# Patient Record
Sex: MALE | Race: WHITE | NOT HISPANIC OR LATINO
[De-identification: names, ages, dates, MRNs, and addresses within clinical notes are randomized per-mention and may not be internally consistent; named-entity substitution may affect disease eponyms.]

---

## 2019-03-04 PROBLEM — Z00.00 ENCOUNTER FOR PREVENTIVE HEALTH EXAMINATION: Status: ACTIVE | Noted: 2019-03-04

## 2019-03-05 ENCOUNTER — TRANSCRIPTION ENCOUNTER (OUTPATIENT)
Age: 59
End: 2019-03-05

## 2019-03-05 ENCOUNTER — APPOINTMENT (OUTPATIENT)
Dept: PULMONOLOGY | Facility: CLINIC | Age: 59
End: 2019-03-05
Payer: COMMERCIAL

## 2019-03-05 VITALS
BODY MASS INDEX: 24.38 KG/M2 | SYSTOLIC BLOOD PRESSURE: 104 MMHG | OXYGEN SATURATION: 97 % | WEIGHT: 180 LBS | HEIGHT: 72 IN | TEMPERATURE: 98.5 F | HEART RATE: 67 BPM | DIASTOLIC BLOOD PRESSURE: 66 MMHG

## 2019-03-05 DIAGNOSIS — I25.10 ATHEROSCLEROTIC HEART DISEASE OF NATIVE CORONARY ARTERY W/OUT ANGINA PECTORIS: ICD-10-CM

## 2019-03-05 DIAGNOSIS — Z82.49 FAMILY HISTORY OF ISCHEMIC HEART DISEASE AND OTHER DISEASES OF THE CIRCULATORY SYSTEM: ICD-10-CM

## 2019-03-05 DIAGNOSIS — E78.5 HYPERLIPIDEMIA, UNSPECIFIED: ICD-10-CM

## 2019-03-05 DIAGNOSIS — Z82.3 FAMILY HISTORY OF STROKE: ICD-10-CM

## 2019-03-05 PROCEDURE — 99205 OFFICE O/P NEW HI 60 MIN: CPT

## 2019-03-05 RX ORDER — ROSUVASTATIN CALCIUM 40 MG/1
40 TABLET, FILM COATED ORAL
Refills: 0 | Status: ACTIVE | COMMUNITY

## 2019-03-05 NOTE — ASSESSMENT
[FreeTextEntry1] : 60 yo man with hx of CAD, HLD self referred for evaluation of asthma. Mr. Pate was diagnosed with "asthma" late in life, and has evidence of bronchiectasis on his imaging by report. I am not convinced this is true allergic asthma; needs to be ruled out for ABPA/vasculitis/CF carrier mutation. He also has increased NIRANJAN use without actual respiratory deterioration which may point to anxiety especially given his wife's recent clinical course, but this is a diagnosis of exclusion. He has never had a workup for bronchiectasis as per his records. Mr. Pate will obtain CT imaging disks and PFTs for my review from [facility]. In the meantime it is prudent to do a full bronchiectasis workup; he has pending lab work this week with his cardiologist and I provided him with the serologies I want drawn so he can have this done simultaneously. Another possibility to consider is that this is atypical reflux, and I advised him on a 2 week PPI trial (Omeprazole 20mg QHS sent to pharmacy). Mr. Pate will follow up with me in 2 weeks after all blood work is resulted and I reviewed his imaging and PFTs.

## 2019-03-05 NOTE — HISTORY OF PRESENT ILLNESS
[Stable] : are stable [Difficulty Breathing During Exertion] : denies dyspnea on exertion [Feelings Of Weakness On Exertion] : denies exercise intolerance [Cough] : stable coughing [Wheezing] : denies wheezing [Regional Soft Tissue Swelling Both Lower Extremities] : denies lower extremity edema [Chest Pain Or Discomfort] : denies chest pain [Fever] : denies fever [0  -  Nothing at all] : 0, nothing at all [Class I - No Symptoms and No Limitations] : I [Oxygen] : the patient uses no supplemental oxygen [FreeTextEntry1] : 60 yo man with hx of CAD, HLD self referred for evaluation of asthma. He was diagnosed with asthma at age 52 because of chest tightness and cough. PFTs were done at that time as well; results not available. He has been on Breo for the past 2 years, which has been escalated because of increased NIRANJAN use but he denies any wheezing and any asthma exacerbations requiring hospitalizations. He has no exercise limitation and is very active; plays soccer regularly. He frequently travels, and has been to [place] and [place], but not recently. Non smoker. Unknown mold exposure. He is endorsing a cough that is very bothersome to him, and may be worse at night. Denies reflux. Does not endorse significant relief from Breo, but may have some relief from NIRANJAN. His NIRANJAN use has been excessive over the past 3 weeks. He has a lot of anxiety about his lung issues because his wife was complaining of cough and was diagnosed with asthma and then ended up having a GI malignancy.  No history of allergies, lung disease when he was a child. He has been followed by [facility] and is now seeking a second opinion.\par \par He showed me some records on his phone; recent CT chest showed mild cylindrical bronchiectasis and areas of emphysema but disks are not available for review.

## 2019-03-05 NOTE — PHYSICAL EXAM
[General Appearance - Well Developed] : well developed [Normal Appearance] : normal appearance [Well Groomed] : well groomed [General Appearance - Well Nourished] : well nourished [No Deformities] : no deformities [General Appearance - In No Acute Distress] : no acute distress [Normal Conjunctiva] : the conjunctiva exhibited no abnormalities [Normal Oropharynx] : normal oropharynx [Neck Appearance] : the appearance of the neck was normal [Heart Rate And Rhythm] : heart rate and rhythm were normal [Heart Sounds] : normal S1 and S2 [] : no respiratory distress [Respiration, Rhythm And Depth] : normal respiratory rhythm and effort [Exaggerated Use Of Accessory Muscles For Inspiration] : no accessory muscle use [Auscultation Breath Sounds / Voice Sounds] : lungs were clear to auscultation bilaterally [Bowel Sounds] : normal bowel sounds [Abdomen Soft] : soft [Abnormal Walk] : normal gait [Nail Clubbing] : no clubbing of the fingernails [Cyanosis, Localized] : no localized cyanosis [Skin Color & Pigmentation] : normal skin color and pigmentation [No Focal Deficits] : no focal deficits [Oriented To Time, Place, And Person] : oriented to person, place, and time [Impaired Insight] : insight and judgment were intact [Affect] : the affect was normal [Mood] : the mood was normal

## 2019-03-08 ENCOUNTER — LABORATORY RESULT (OUTPATIENT)
Age: 59
End: 2019-03-08

## 2019-03-09 LAB
A1AT SERPL-MCNC: 135 MG/DL
ALBUMIN SERPL ELPH-MCNC: 4.7 G/DL
ALP BLD-CCNC: 55 U/L
ALT SERPL-CCNC: 31 U/L
ANION GAP SERPL CALC-SCNC: 11 MMOL/L
AST SERPL-CCNC: 31 U/L
BASOPHILS # BLD AUTO: 0.03 K/UL
BASOPHILS NFR BLD AUTO: 0.6 %
BILIRUB SERPL-MCNC: 0.5 MG/DL
BUN SERPL-MCNC: 14 MG/DL
CALCIUM SERPL-MCNC: 9.9 MG/DL
CHLORIDE SERPL-SCNC: 103 MMOL/L
CO2 SERPL-SCNC: 28 MMOL/L
CREAT SERPL-MCNC: 1.09 MG/DL
EOSINOPHIL # BLD AUTO: 0.08 K/UL
EOSINOPHIL NFR BLD AUTO: 1.6 %
GLUCOSE SERPL-MCNC: 93 MG/DL
HCT VFR BLD CALC: 44.9 %
HGB BLD-MCNC: 14.8 G/DL
HIV1+2 AB SPEC QL IA.RAPID: NONREACTIVE
IMM GRANULOCYTES NFR BLD AUTO: 0.2 %
LYMPHOCYTES # BLD AUTO: 1.82 K/UL
LYMPHOCYTES NFR BLD AUTO: 36.1 %
MAN DIFF?: NORMAL
MCHC RBC-ENTMCNC: 31.2 PG
MCHC RBC-ENTMCNC: 33 GM/DL
MCV RBC AUTO: 94.5 FL
MONOCYTES # BLD AUTO: 0.49 K/UL
MONOCYTES NFR BLD AUTO: 9.7 %
NEUTROPHILS # BLD AUTO: 2.61 K/UL
NEUTROPHILS NFR BLD AUTO: 51.8 %
PLATELET # BLD AUTO: 206 K/UL
POTASSIUM SERPL-SCNC: 5.1 MMOL/L
PROT SERPL-MCNC: 7.1 G/DL
RBC # BLD: 4.75 M/UL
RBC # FLD: 12.2 %
RHEUMATOID FACT SER QL: <10 IU/ML
SODIUM SERPL-SCNC: 142 MMOL/L
WBC # FLD AUTO: 5.04 K/UL

## 2019-03-11 ENCOUNTER — RESULT REVIEW (OUTPATIENT)
Age: 59
End: 2019-03-11

## 2019-03-11 LAB
ANA SER IF-ACNC: NEGATIVE
CCP AB SER IA-ACNC: <8 UNITS
DEPRECATED KAPPA LC FREE/LAMBDA SER: 0.94 RATIO
IGA SER QL IEP: 134 MG/DL
IGG SER QL IEP: 949 MG/DL
IGM SER QL IEP: 106 MG/DL
KAPPA LC CSF-MCNC: 1.12 MG/DL
KAPPA LC SERPL-MCNC: 1.05 MG/DL
MPO AB + PR3 PNL SER: NORMAL
RF+CCP IGG SER-IMP: NEGATIVE

## 2019-03-12 ENCOUNTER — RESULT REVIEW (OUTPATIENT)
Age: 59
End: 2019-03-12

## 2019-03-12 ENCOUNTER — TRANSCRIPTION ENCOUNTER (OUTPATIENT)
Age: 59
End: 2019-03-12

## 2019-03-12 LAB — GALACTOMANNAN AG SERPL QL IA: <0.5 INDEX

## 2019-03-13 ENCOUNTER — RESULT REVIEW (OUTPATIENT)
Age: 59
End: 2019-03-13

## 2019-03-13 LAB
A FLAVUS AB FLD QL: NEGATIVE
A FUMIGATUS AB FLD QL: NEGATIVE
A NIGER AB FLD QL: NEGATIVE

## 2019-03-14 ENCOUNTER — RESULT REVIEW (OUTPATIENT)
Age: 59
End: 2019-03-14

## 2019-03-14 LAB
ASPERGILLUS FLAVUS PRECIPITINS: NEGATIVE
ASPERGILLUS FUMIGATES PRECIPTINS: NEGATIVE
ASPERGILLUS NIGER PRECIPITINS: NEGATIVE

## 2019-03-18 ENCOUNTER — RESULT REVIEW (OUTPATIENT)
Age: 59
End: 2019-03-18

## 2019-03-18 ENCOUNTER — TRANSCRIPTION ENCOUNTER (OUTPATIENT)
Age: 59
End: 2019-03-18

## 2019-03-18 LAB
CFTR MUT TESTED BLD/T: POSITIVE
TOTAL IGE SMQN RAST: 50 KU/L

## 2019-03-21 ENCOUNTER — TRANSCRIPTION ENCOUNTER (OUTPATIENT)
Age: 59
End: 2019-03-21

## 2019-03-25 ENCOUNTER — TRANSCRIPTION ENCOUNTER (OUTPATIENT)
Age: 59
End: 2019-03-25

## 2019-03-26 ENCOUNTER — APPOINTMENT (OUTPATIENT)
Dept: INTERNAL MEDICINE | Facility: CLINIC | Age: 59
End: 2019-03-26

## 2019-03-26 ENCOUNTER — APPOINTMENT (OUTPATIENT)
Dept: PULMONOLOGY | Facility: CLINIC | Age: 59
End: 2019-03-26
Payer: COMMERCIAL

## 2019-03-26 VITALS
HEIGHT: 72 IN | TEMPERATURE: 97.8 F | OXYGEN SATURATION: 96 % | BODY MASS INDEX: 24.38 KG/M2 | HEART RATE: 51 BPM | DIASTOLIC BLOOD PRESSURE: 83 MMHG | SYSTOLIC BLOOD PRESSURE: 143 MMHG | WEIGHT: 180 LBS

## 2019-03-26 PROCEDURE — 99214 OFFICE O/P EST MOD 30 MIN: CPT

## 2019-03-26 RX ORDER — OMEPRAZOLE 20 MG/1
20 TABLET, DELAYED RELEASE ORAL
Qty: 14 | Refills: 0 | Status: DISCONTINUED | COMMUNITY
Start: 2019-03-05 | End: 2019-03-26

## 2019-03-26 NOTE — PHYSICAL EXAM
[General Appearance - Well Developed] : well developed [Normal Appearance] : normal appearance [Well Groomed] : well groomed [General Appearance - Well Nourished] : well nourished [No Deformities] : no deformities [General Appearance - In No Acute Distress] : no acute distress [Normal Conjunctiva] : the conjunctiva exhibited no abnormalities [Normal Oropharynx] : normal oropharynx [Neck Appearance] : the appearance of the neck was normal [Heart Rate And Rhythm] : heart rate and rhythm were normal [Heart Sounds] : normal S1 and S2 [Respiration, Rhythm And Depth] : normal respiratory rhythm and effort [Exaggerated Use Of Accessory Muscles For Inspiration] : no accessory muscle use [Auscultation Breath Sounds / Voice Sounds] : lungs were clear to auscultation bilaterally [Bowel Sounds] : normal bowel sounds [Abdomen Soft] : soft [Abnormal Walk] : normal gait [Nail Clubbing] : no clubbing of the fingernails [Cyanosis, Localized] : no localized cyanosis [Skin Color & Pigmentation] : normal skin color and pigmentation [] : no rash [No Focal Deficits] : no focal deficits [Oriented To Time, Place, And Person] : oriented to person, place, and time [Impaired Insight] : insight and judgment were intact [Affect] : the affect was normal [Mood] : the mood was normal

## 2019-03-26 NOTE — HISTORY OF PRESENT ILLNESS
[Stable] : are stable [Difficulty Breathing During Exertion] : denies dyspnea on exertion [Feelings Of Weakness On Exertion] : denies exercise intolerance [Cough] : stable coughing [Wheezing] : denies wheezing [Regional Soft Tissue Swelling Both Lower Extremities] : denies lower extremity edema [Chest Pain Or Discomfort] : denies chest pain [Fever] : denies fever [Oxygen] : the patient uses no supplemental oxygen [0  -  Nothing at all] : 0, nothing at all [Class I - No Symptoms and No Limitations] : I [FreeTextEntry1] : 60 yo man with hx of CAD, HLD and "asthma" here for follow up. Initially saw me on 3/26/2019 for a second opinion for persistent cough and asthma diagnosis. Imaging showed bronchiectasis. Has been taking Breo for 2 years without real improvement. Has no exercise limitation. Has been using PPI and thought he felt a difference initially but not any more. Has nasal stuffiness and throat clearing but is unsure about post nasal drip.

## 2019-03-26 NOTE — ASSESSMENT
[FreeTextEntry1] : 60 yo with a diagnosis of "asthma" here for follow up. He has mild bronchiectasis on imaging and has a clinical history that is not consistent with asthma. He has no exercise limitation; has never had an exacerbation; and has no benefit from any ICS/LABA combination. I initiated a workup for bronchiectasis and he is now known to be a Cystic Fibrosis carrier ([id number]). He has children who are all healthy; he will inform his children about the diagnosis since they may be carriers and this may impact family planning. Long term clinical outcomes on CF carriers are not known but this population is being diagnosed with increasing frequency and many have some mild form of organ dysfunction that may or may not be influencing function.  I think his bronchiectasis is stemming from his CF carrier status. I am also not sure about the extent of limitation of lung function because of the bronchiectasis, and he is still working on getting his prior PFT records. We will perform PFTs and a 6 MWT as well since his last assessment was in 2018. His cough and upper airway symptoms may be secondary to rhinitis so I prescribed a nasal ICS and H2 blocker. I informed him that it will take at least 4 weeks of regular use to see any benefit. He is to continue with a PPI. He will attempt a trial off Breo and will monitor the use of NIRANJAN. He will follow up with me in 4-6 weeks.

## 2019-04-09 ENCOUNTER — TRANSCRIPTION ENCOUNTER (OUTPATIENT)
Age: 59
End: 2019-04-09

## 2019-04-16 ENCOUNTER — APPOINTMENT (OUTPATIENT)
Dept: PULMONOLOGY | Facility: CLINIC | Age: 59
End: 2019-04-16
Payer: COMMERCIAL

## 2019-04-16 PROCEDURE — 94726 PLETHYSMOGRAPHY LUNG VOLUMES: CPT

## 2019-04-16 PROCEDURE — 94618 PULMONARY STRESS TESTING: CPT

## 2019-04-16 PROCEDURE — 94060 EVALUATION OF WHEEZING: CPT

## 2019-04-16 PROCEDURE — 94729 DIFFUSING CAPACITY: CPT

## 2019-04-19 ENCOUNTER — RX RENEWAL (OUTPATIENT)
Age: 59
End: 2019-04-19

## 2019-04-19 RX ORDER — OMEPRAZOLE 20 MG/1
20 CAPSULE, DELAYED RELEASE ORAL
Qty: 30 | Refills: 3 | Status: ACTIVE | COMMUNITY
Start: 2019-03-18

## 2019-04-22 ENCOUNTER — MOBILE ON CALL (OUTPATIENT)
Age: 59
End: 2019-04-22

## 2019-04-23 ENCOUNTER — APPOINTMENT (OUTPATIENT)
Dept: PULMONOLOGY | Facility: CLINIC | Age: 59
End: 2019-04-23
Payer: COMMERCIAL

## 2019-04-23 VITALS
BODY MASS INDEX: 24.11 KG/M2 | OXYGEN SATURATION: 96 % | DIASTOLIC BLOOD PRESSURE: 63 MMHG | SYSTOLIC BLOOD PRESSURE: 101 MMHG | HEIGHT: 72 IN | WEIGHT: 178 LBS | HEART RATE: 67 BPM | TEMPERATURE: 98.1 F

## 2019-04-23 PROCEDURE — 99214 OFFICE O/P EST MOD 30 MIN: CPT

## 2019-04-23 RX ORDER — FLUTICASONE FUROATE AND VILANTEROL TRIFENATATE 100; 25 UG/1; UG/1
100-25 POWDER RESPIRATORY (INHALATION)
Refills: 0 | Status: DISCONTINUED | COMMUNITY
End: 2019-04-23

## 2019-04-23 NOTE — PHYSICAL EXAM
[General Appearance - Well Developed] : well developed [Normal Conjunctiva] : the conjunctiva exhibited no abnormalities [General Appearance - Well Nourished] : well nourished [Neck Appearance] : the appearance of the neck was normal [Normal Oropharynx] : normal oropharynx [] : no respiratory distress [Heart Rate And Rhythm] : heart rate and rhythm were normal [Respiration, Rhythm And Depth] : normal respiratory rhythm and effort [Exaggerated Use Of Accessory Muscles For Inspiration] : no accessory muscle use [Auscultation Breath Sounds / Voice Sounds] : lungs were clear to auscultation bilaterally [Abdomen Soft] : soft [Abnormal Walk] : normal gait [Bowel Sounds] : normal bowel sounds [Nail Clubbing] : no clubbing of the fingernails [Cyanosis, Localized] : no localized cyanosis [Skin Color & Pigmentation] : normal skin color and pigmentation [No Focal Deficits] : no focal deficits [Oriented To Time, Place, And Person] : oriented to person, place, and time [Affect] : the affect was normal [Impaired Insight] : insight and judgment were intact [Mood] : the mood was normal

## 2019-04-23 NOTE — HISTORY OF PRESENT ILLNESS
[Difficulty Breathing During Exertion] : denies dyspnea on exertion [Feelings Of Weakness On Exertion] : denies exercise intolerance [Improved] : have improved [Wheezing] : denies wheezing [Cough] : denies coughing [Fever] : denies fever [Chest Pain Or Discomfort] : denies chest pain [Regional Soft Tissue Swelling Both Lower Extremities] : denies lower extremity edema [0  -  Nothing at all] : 0, nothing at all [Class I - No Symptoms and No Limitations] : I [Oxygen] : the patient uses no supplemental oxygen [FreeTextEntry1] : 60 yo man with hx of CAD, HLD here for follow up. He previously had a diagnosis of asthma. On further work up we now know he has bronchiectasis with mild obstruction in  the setting of being a CF carrier. Also with rhinitis; and has been on nasal ICS/antihistamine. Has been doing well. Stopped taking Breo; no need for NIRANJAN. Rhinitis management has been helping significantly. No exercise limitation. Is still taking a PPI.

## 2019-04-23 NOTE — ASSESSMENT
[FreeTextEntry1] : 58 yo man with HTN, CAD, bronchiectasis, mild airway obstruction, and CF carrier. PFTs performed previously showed mild airway obstruction with normal DLCO. FEV1 stable as compared to prior spirometry from [facility]. Doing well off all bronchodilators, and on rhinitis treatment with nasal ICS and antihistamine. Can continue NIRANJAN use if needed. I advised him that he can stop using PPI for now (initially started because of cough) and see if his symptoms worsen. If they do then he will need to see a gastroenterologist for further GERD workup/evaluation. If his rhinitis symptoms are not 100% controlled in another 2 to 4 weeks will add Singular. Mr. Pate will keep me informed on his progress by telephone. Return for follow up in 3-6 months or sooner if needed.

## 2019-09-14 ENCOUNTER — EMERGENCY (EMERGENCY)
Facility: HOSPITAL | Age: 59
LOS: 1 days | Discharge: HOME | End: 2019-09-14
Attending: EMERGENCY MEDICINE | Admitting: EMERGENCY MEDICINE
Payer: COMMERCIAL

## 2019-09-14 VITALS
DIASTOLIC BLOOD PRESSURE: 79 MMHG | SYSTOLIC BLOOD PRESSURE: 136 MMHG | RESPIRATION RATE: 18 BRPM | HEART RATE: 84 BPM | TEMPERATURE: 98 F | OXYGEN SATURATION: 99 %

## 2019-09-14 DIAGNOSIS — M54.5 LOW BACK PAIN: ICD-10-CM

## 2019-09-14 DIAGNOSIS — M62.830 MUSCLE SPASM OF BACK: ICD-10-CM

## 2019-09-14 PROCEDURE — 99283 EMERGENCY DEPT VISIT LOW MDM: CPT

## 2019-09-14 RX ORDER — DIAZEPAM 5 MG
1 TABLET ORAL
Qty: 10 | Refills: 0
Start: 2019-09-14

## 2019-09-14 RX ORDER — DIAZEPAM 5 MG
5 TABLET ORAL ONCE
Refills: 0 | Status: DISCONTINUED | OUTPATIENT
Start: 2019-09-14 | End: 2019-09-14

## 2019-09-14 RX ADMIN — Medication 5 MILLIGRAM(S): at 16:04

## 2019-09-14 NOTE — ED PROVIDER NOTE - PATIENT PORTAL LINK FT
You can access the FollowMyHealth Patient Portal offered by [facility] by registering at the following website: [email or web address]. By joining [employer]’s FollowMyHealth portal, you will also be able to view your health information using other applications (apps) compatible with our system.

## 2019-09-14 NOTE — ED ADULT TRIAGE NOTE - CHIEF COMPLAINT QUOTE
Patient to ED with complaint of back spasm beginning today.  Ambulating with steady gait and normal ROM

## 2019-09-14 NOTE — ED ADULT NURSE NOTE - NSIMPLEMENTINTERV_GEN_ALL_ED
Implemented All Universal Safety Interventions:  [place] to call system. Call bell, personal items and telephone within reach. Instruct patient to call for assistance. Room bathroom lighting operational. Non-slip footwear when patient is off stretcher. Physically safe environment: no spills, clutter or unnecessary equipment. Stretcher in lowest position, wheels locked, appropriate side rails in place.

## 2019-09-14 NOTE — ED PROVIDER NOTE - PHYSICAL EXAMINATION
CONSTITUTIONAL: Well appearing, well nourished, awake, alert, oriented to person, place, time/situation and in no apparent distress.  ENT: Airway patent, Nasal mucosa clear. Mouth with normal mucosa.  EYES: Clear bilaterally.  RESPIRATORY: Breathing comfortably with normal RR.  MSK: Range of motion is not limited, no deformities noted.  NEURO: Alert and oriented, no focal deficits.  SKIN: Skin normal color for race, warm, dry and intact. No evidence of rash.  PSYCH: Alert and oriented to person, place, time/situation. normal mood and affect. no apparent risk to self or others. CONSTITUTIONAL: Well appearing, well nourished, awake, alert, oriented to person, place, time/situation and in no apparent distress.  ENT: Airway patent, Nasal mucosa clear. Mouth with normal mucosa.  EYES: Clear bilaterally.  RESPIRATORY: Breathing comfortably with normal RR.  MSK: Range of motion is not limited, no deformities noted.  No midline TTP over spine.   NEURO: Alert and oriented, no focal deficits.  SKIN: Skin normal color for race, warm, dry and intact. No evidence of rash.  PSYCH: Alert and oriented to person, place, time/situation. normal mood and affect. no apparent risk to self or others.

## 2019-09-14 NOTE — ED PROVIDER NOTE - NSFOLLOWUPINSTRUCTIONS_ED_ALL_ED_FT
STAY WELL HYDRATED.     START STRETCHING TOMORROW FOR AT LEAST 15 MINUTES AT A TIME 2-3 TIMES A DAY.     PLEASE RETURN TO THE ER IMMEDIATELY OR CALL 911 FOR ANY HIGH FEVER, TROUBLE BREATHING, VOMITING, SEVERE PAIN, OR ANY OTHER CONCERNS.

## 2019-09-14 NOTE — ED PROVIDER NOTE - CLINICAL SUMMARY MEDICAL DECISION MAKING FREE TEXT BOX
Muscle spasm to lower back.  Will start on Valium for muscle relaxations and discussed stretching techniques and other supportive measures.

## 2019-09-14 NOTE — ED PROVIDER NOTE - OBJECTIVE STATEMENT
58 y/o male with PMHx of HLD presents to ED c/o lower back pain. Patient reports he went to the gym on Tuesday and since then has been having lower back pain presenting as tightness that turned into spasms. Denies any numbness, tingling, weakness, urinary or stool incontinence, or saddle paresthesia. States he took Motrin this morning without any symptomatic relief. Patient has appointment with     Sports Medicine MD Dr. Edward Mendelsohn. 60 y/o male with PMHx of HLD presents to ED c/o lower back pain. Patient reports he went to the gym on Tuesday lifting more weight than usual and since then has been having lower back pain presenting as tightness that turned into spasms. Denies any numbness, tingling, weakness, urinary or stool incontinence, or saddle paresthesia. States he took Motrin this morning without any symptomatic relief. Patient has appointment with Sports Medicine MD Dr. Edward Mendelsohn. 60 y/o male with PMHx of HLD presents to ED c/o lower back pain. Patient reports he went to the gym on Tuesday lifting more weight than usual and since then has been having lower back pain presenting as tightness that turned into spasms. Denies any numbness, tingling, weakness, urinary or stool incontinence, or saddle paresthesia. States he took Motrin this morning without any symptomatic relief. Patient has appointment with Sports Medicine MD Dr. Edward Mendelsohn.  Denies any changes in color of urine.

## 2019-10-07 ENCOUNTER — MEDICATION RENEWAL (OUTPATIENT)
Age: 59
End: 2019-10-07

## 2019-11-22 ENCOUNTER — RX RENEWAL (OUTPATIENT)
Age: 59
End: 2019-11-22

## 2019-11-22 ENCOUNTER — MOBILE ON CALL (OUTPATIENT)
Age: 59
End: 2019-11-22

## 2019-11-29 ENCOUNTER — MOBILE ON CALL (OUTPATIENT)
Age: 59
End: 2019-11-29

## 2019-11-29 ENCOUNTER — TRANSCRIPTION ENCOUNTER (OUTPATIENT)
Age: 59
End: 2019-11-29

## 2019-11-29 PROBLEM — E78.5 HYPERLIPIDEMIA, UNSPECIFIED: Chronic | Status: ACTIVE | Noted: 2019-09-14

## 2019-12-11 ENCOUNTER — APPOINTMENT (OUTPATIENT)
Dept: PULMONOLOGY | Facility: CLINIC | Age: 59
End: 2019-12-11

## 2019-12-12 ENCOUNTER — APPOINTMENT (OUTPATIENT)
Dept: PULMONOLOGY | Facility: CLINIC | Age: 59
End: 2019-12-12
Payer: COMMERCIAL

## 2019-12-12 PROCEDURE — 94726 PLETHYSMOGRAPHY LUNG VOLUMES: CPT

## 2019-12-12 PROCEDURE — 94729 DIFFUSING CAPACITY: CPT

## 2019-12-12 PROCEDURE — 94060 EVALUATION OF WHEEZING: CPT

## 2019-12-12 PROCEDURE — ZZZZZ: CPT

## 2019-12-13 ENCOUNTER — TRANSCRIPTION ENCOUNTER (OUTPATIENT)
Age: 59
End: 2019-12-13

## 2019-12-20 ENCOUNTER — APPOINTMENT (OUTPATIENT)
Dept: PULMONOLOGY | Facility: CLINIC | Age: 59
End: 2019-12-20
Payer: COMMERCIAL

## 2019-12-20 VITALS
OXYGEN SATURATION: 95 % | HEART RATE: 83 BPM | HEIGHT: 72 IN | WEIGHT: 178 LBS | TEMPERATURE: 97.8 F | BODY MASS INDEX: 24.11 KG/M2 | SYSTOLIC BLOOD PRESSURE: 127 MMHG | DIASTOLIC BLOOD PRESSURE: 82 MMHG

## 2019-12-20 PROCEDURE — 99214 OFFICE O/P EST MOD 30 MIN: CPT

## 2019-12-20 NOTE — PHYSICAL EXAM
[General Appearance - Well Developed] : well developed [General Appearance - Well Nourished] : well nourished [Normal Oropharynx] : normal oropharynx [Normal Conjunctiva] : the conjunctiva exhibited no abnormalities [Neck Appearance] : the appearance of the neck was normal [] : no respiratory distress [Heart Rate And Rhythm] : heart rate and rhythm were normal [Respiration, Rhythm And Depth] : normal respiratory rhythm and effort [Exaggerated Use Of Accessory Muscles For Inspiration] : no accessory muscle use [Auscultation Breath Sounds / Voice Sounds] : lungs were clear to auscultation bilaterally [Bowel Sounds] : normal bowel sounds [Abdomen Soft] : soft [Abnormal Walk] : normal gait [Cyanosis, Localized] : no localized cyanosis [Skin Color & Pigmentation] : normal skin color and pigmentation [Nail Clubbing] : no clubbing of the fingernails [No Focal Deficits] : no focal deficits [Mood] : the mood was normal [Oriented To Time, Place, And Person] : oriented to person, place, and time [Affect] : the affect was normal [Impaired Insight] : insight and judgment were intact

## 2019-12-20 NOTE — PROCEDURE
[FreeTextEntry1] : PFTs from 12/12/2019\par FEV1 70 (74 in 4/20/19)\par FEV1/FVC 59 (62 in 4/20/19)\par TLC 87\par DLCO 89\par no BD response\par \par No significant change in PFTS as compared to 4/2019.\par Mild obstruction. \par

## 2019-12-20 NOTE — ASSESSMENT
[FreeTextEntry1] : 60 yo man with hx of CAD, HLD here for follow up. Is a CF carrier and has bronchiectasis. Has rhinitis. Mild obstruction on PFTs with minimal change on testing on 12/12/2019 as compared to 4/20/2019. Can continue with NIRANJAN use only on an as needed basis; no escalation in therapy needed. I advised him to start nasal irrigation for at least 4 weeks; after that we will start to decrease his nasal spray use. Advised him to not stop any/both of the strays abruptly as this may cause rebound symptoms. Mr. Pate will follow up with me in 6 to 8 weeks of nasal irrigation use.

## 2019-12-20 NOTE — HISTORY OF PRESENT ILLNESS
[Difficulty Breathing During Exertion] : denies dyspnea on exertion [Improved] : have improved [Feelings Of Weakness On Exertion] : denies exercise intolerance [Cough] : denies coughing [Wheezing] : denies wheezing [Chest Pain Or Discomfort] : denies chest pain [Fever] : denies fever [Regional Soft Tissue Swelling Both Lower Extremities] : denies lower extremity edema [Oxygen] : the patient uses no supplemental oxygen [Class I - No Symptoms and No Limitations] : I [0  -  Nothing at all] : 0, nothing at all [FreeTextEntry1] : 60 yo man with hx of CAD, HLD here for follow up. He previously had a diagnosis of asthma. On further work up we now know he has bronchiectasis with mild obstruction in  the setting of being a CF carrier. Also with rhinitis; and has been on nasal ICS/antihistamine. Has been doing well without any limitation. Able to exercise without any issues. Stopped PPI use.

## 2020-01-08 ENCOUNTER — APPOINTMENT (OUTPATIENT)
Dept: PULMONOLOGY | Facility: CLINIC | Age: 60
End: 2020-01-08

## 2020-01-31 ENCOUNTER — TRANSCRIPTION ENCOUNTER (OUTPATIENT)
Age: 60
End: 2020-01-31

## 2020-03-31 ENCOUNTER — TRANSCRIPTION ENCOUNTER (OUTPATIENT)
Age: 60
End: 2020-03-31

## 2021-01-05 ENCOUNTER — TRANSCRIPTION ENCOUNTER (OUTPATIENT)
Age: 61
End: 2021-01-05

## 2021-02-23 ENCOUNTER — APPOINTMENT (OUTPATIENT)
Dept: PULMONOLOGY | Facility: CLINIC | Age: 61
End: 2021-02-23
Payer: COMMERCIAL

## 2021-02-23 VITALS
HEIGHT: 72 IN | SYSTOLIC BLOOD PRESSURE: 125 MMHG | DIASTOLIC BLOOD PRESSURE: 78 MMHG | WEIGHT: 176 LBS | BODY MASS INDEX: 23.84 KG/M2 | TEMPERATURE: 97.8 F | HEART RATE: 58 BPM | OXYGEN SATURATION: 96 %

## 2021-02-23 PROCEDURE — 99072 ADDL SUPL MATRL&STAF TM PHE: CPT

## 2021-02-23 PROCEDURE — 99213 OFFICE O/P EST LOW 20 MIN: CPT

## 2021-02-23 RX ORDER — AZELASTINE HYDROCHLORIDE 137 UG/1
0.1 SPRAY, METERED NASAL DAILY
Qty: 1 | Refills: 3 | Status: DISCONTINUED | COMMUNITY
Start: 2019-03-26 | End: 2021-02-23

## 2021-02-24 RX ORDER — ASPIRIN ENTERIC COATED TABLETS 81 MG 81 MG/1
81 TABLET, DELAYED RELEASE ORAL
Qty: 30 | Refills: 0 | Status: ACTIVE | COMMUNITY
Start: 2021-01-15

## 2021-02-24 NOTE — PHYSICAL EXAM
[No Acute Distress] : no acute distress [Normal Oropharynx] : normal oropharynx [Normal Appearance] : normal appearance [Normal Rate/Rhythm] : normal rate/rhythm [No Resp Distress] : no resp distress [Clear to Auscultation Bilaterally] : clear to auscultation bilaterally [No Abnormalities] : no abnormalities [Benign] : benign [Normal Gait] : normal gait [No Clubbing] : no clubbing [Normal Color/ Pigmentation] : normal color/ pigmentation [No Focal Deficits] : no focal deficits [Oriented x3] : oriented x3 [Normal Affect] : normal affect

## 2021-02-24 NOTE — HISTORY OF PRESENT ILLNESS
[Never] : never [TextBox_4] : 60 yo man with hx of CAD, HLD here for follow up. He previously had a diagnosis of asthma. On further work up we now know he has bronchiectasis with mild obstruction in the setting of being a CF carrier. Also with rhinitis; and has been on nasal ICS/antihistamine. Has been doing well without any limitation. Able to exercise without any issues. Stopped PPI use. \par \par \par 2/23/2021\par Has been doing well. Using Flonase and Azelastine, nasal irrigation. No exercise limitations. 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 Used NIRANJAN a few times, and felt relief. No wheezing.

## 2021-02-24 NOTE — REASON FOR VISIT
[Follow-Up] : a follow-up visit [Bronchiectasis] : bronchiectasis [Shortness of Breath] : shortness of breath

## 2022-03-07 ENCOUNTER — TRANSCRIPTION ENCOUNTER (OUTPATIENT)
Age: 62
End: 2022-03-07

## 2022-03-07 RX ORDER — ALBUTEROL SULFATE 90 UG/1
108 (90 BASE) INHALANT RESPIRATORY (INHALATION)
Qty: 1 | Refills: 5 | Status: ACTIVE | COMMUNITY
Start: 1900-01-01 | End: 1900-01-01

## 2022-05-03 ENCOUNTER — APPOINTMENT (OUTPATIENT)
Dept: PULMONOLOGY | Facility: CLINIC | Age: 62
End: 2022-05-03
Payer: COMMERCIAL

## 2022-05-03 VITALS
TEMPERATURE: 97.7 F | WEIGHT: 186 LBS | OXYGEN SATURATION: 97 % | HEIGHT: 72 IN | SYSTOLIC BLOOD PRESSURE: 106 MMHG | DIASTOLIC BLOOD PRESSURE: 66 MMHG | BODY MASS INDEX: 25.19 KG/M2 | HEART RATE: 50 BPM

## 2022-05-03 PROCEDURE — 99214 OFFICE O/P EST MOD 30 MIN: CPT

## 2022-05-05 NOTE — ASSESSMENT
[FreeTextEntry1] : REVIEWED:\par PFTs from 12/12/2019\par FEV1 70 (74 in 4/20/19)\par FEV1/FVC 59 (62 in 4/20/19)\par TLC 87\par DLCO 89\par no BD response\par No significant change in PFTS as compared to 4/2019.\par Mild obstruction. \par \par CT chest 2018: bronchiectasis\par \par 60yo man with CAD, HLD, CF carrier with bronchiectasis. Has noted an increase in chest congestion. May be related to weather changes. Discussed airway clearance with [facility]; ordering information provided. Due for PFTs and CT chest to ensure stability of lung function and bronchiectasis, respectively. Can resume Flonase for post nasal drip. Follow up after testing is completed.\par

## 2022-05-05 NOTE — HISTORY OF PRESENT ILLNESS
[Never] : never [TextBox_4] : 60 yo man with hx of CAD, HLD here for follow up. He previously had a diagnosis of asthma. On further work up we now know he has bronchiectasis with mild obstruction in the setting of being a CF carrier. Also with rhinitis; and has been on nasal ICS/antihistamine. Has been doing well without any limitation. Able to exercise without any issues. Stopped PPI use. \par \par \par 2/23/2021\par Has been doing well. Using Flonase and Azelastine, nasal irrigation. No exercise limitations. 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 Used NIRANJAN a few times, and felt relief. No wheezing. \par \par 5/3/2022\par Thinks he is having more increasing chest congestion, and throat clearing. Feels like there could be a limitation to his full inspiratory effort. No issues with significant exercise and exertion. Does have some throat clearing; off all nasal sprays.

## 2022-05-06 ENCOUNTER — OUTPATIENT (OUTPATIENT)
Dept: OUTPATIENT SERVICES | Facility: HOSPITAL | Age: 62
LOS: 1 days | End: 2022-05-06

## 2022-05-06 ENCOUNTER — APPOINTMENT (OUTPATIENT)
Dept: CT IMAGING | Facility: CLINIC | Age: 62
End: 2022-05-06
Payer: COMMERCIAL

## 2022-05-06 ENCOUNTER — RESULT REVIEW (OUTPATIENT)
Age: 62
End: 2022-05-06

## 2022-05-06 PROCEDURE — 71250 CT THORAX DX C-: CPT | Mod: 26

## 2022-05-11 ENCOUNTER — NON-APPOINTMENT (OUTPATIENT)
Age: 62
End: 2022-05-11

## 2022-06-10 ENCOUNTER — NON-APPOINTMENT (OUTPATIENT)
Age: 62
End: 2022-06-10

## 2022-06-10 ENCOUNTER — LABORATORY RESULT (OUTPATIENT)
Age: 62
End: 2022-06-10

## 2022-06-13 ENCOUNTER — APPOINTMENT (OUTPATIENT)
Dept: PULMONOLOGY | Facility: CLINIC | Age: 62
End: 2022-06-13

## 2022-06-13 ENCOUNTER — TRANSCRIPTION ENCOUNTER (OUTPATIENT)
Age: 62
End: 2022-06-13

## 2022-06-14 ENCOUNTER — APPOINTMENT (OUTPATIENT)
Dept: PULMONOLOGY | Facility: CLINIC | Age: 62
End: 2022-06-14
Payer: COMMERCIAL

## 2022-06-14 PROCEDURE — 94729 DIFFUSING CAPACITY: CPT

## 2022-06-14 PROCEDURE — 94060 EVALUATION OF WHEEZING: CPT

## 2022-06-14 PROCEDURE — 94618 PULMONARY STRESS TESTING: CPT

## 2022-06-14 PROCEDURE — 94726 PLETHYSMOGRAPHY LUNG VOLUMES: CPT

## 2022-06-17 ENCOUNTER — APPOINTMENT (OUTPATIENT)
Dept: PULMONOLOGY | Facility: CLINIC | Age: 62
End: 2022-06-17
Payer: COMMERCIAL

## 2022-06-17 PROCEDURE — 99443: CPT

## 2022-06-17 NOTE — ASSESSMENT
[FreeTextEntry1] : REVIEWED:\par PFTs from 12/12/2019\par FEV1 70 (74 in 4/20/19)\par FEV1/FVC 59 (62 in 4/20/19)\par TLC 87\par DLCO 89\par no BD response\par Mild obstruction\par \par PFTs from 6/14/2022: FEV1 62, FEV1/FVC 55, TLC 77, RV 44, DLCO 77\par *moderate obstruction, mild restriction\par \par 6MWT from 6/14/202 624 meters walked; no desaturation\par \par CT chest 2018: bronchiectasis\par \par CT chest 2022: mild bronchiectasis; some areas of emphysema; RLL atelectatic/fibrotic opacity in subpleural area\par \par 60yo man with CAD, HLD, CF carrier with bronchiectasis. Has noted an increase in chest congestion but transient. Cough not an issue at this time. PFTs with a slight decrease in FEV1 but overall the same. No desaturation on 6MWT. CT chest with no significant change as compared to 2018. Going to [place] for 1 year for work.  Can resume Flonase for post nasal drip if needed when in [place]. Follow up when back from [place], sooner if needed.\par

## 2022-06-17 NOTE — HISTORY OF PRESENT ILLNESS
[Home] : at home, [unfilled] , at the time of the visit. [Medical Office: ([facility])___] : at the medical office located in  [Verbal consent obtained from patient] : the patient, [unfilled] [Never] : never [TextBox_4] : 60 yo man with hx of CAD, HLD here for follow up. He previously had a diagnosis of asthma. On further work up we now know he has bronchiectasis with mild obstruction in the setting of being a CF carrier. Also with rhinitis; and has been on nasal ICS/antihistamine. Has been doing well without any limitation. Able to exercise without any issues. Stopped PPI use. \par \par \par 2/23/2021\par Has been doing well. Using Flonase and Azelastine, nasal irrigation. No exercise limitations. 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 Used NIRANJAN a few times, and felt relief. No wheezing. \par \par 5/3/2022\par Thinks he is having more increasing chest congestion, and throat clearing. Feels like there could be a limitation to his full inspiratory effort. No issues with significant exercise and exertion. Does have some throat clearing; off all nasal sprays.\par \par 6/17/2022\par Had PFTs and CT chest, would like to discuss results. Doing well.

## 2023-11-14 ENCOUNTER — APPOINTMENT (OUTPATIENT)
Dept: PULMONOLOGY | Facility: CLINIC | Age: 63
End: 2023-11-14
Payer: COMMERCIAL

## 2023-11-14 VITALS
HEART RATE: 56 BPM | DIASTOLIC BLOOD PRESSURE: 67 MMHG | BODY MASS INDEX: 24.65 KG/M2 | TEMPERATURE: 97.7 F | OXYGEN SATURATION: 98 % | WEIGHT: 182 LBS | HEIGHT: 72 IN | SYSTOLIC BLOOD PRESSURE: 102 MMHG

## 2023-11-14 DIAGNOSIS — J47.9 BRONCHIECTASIS, UNCOMPLICATED: ICD-10-CM

## 2023-11-14 DIAGNOSIS — R05.9 COUGH, UNSPECIFIED: ICD-10-CM

## 2023-11-14 DIAGNOSIS — J31.0 CHRONIC RHINITIS: ICD-10-CM

## 2023-11-14 DIAGNOSIS — Z14.1 CYSTIC FIBROSIS CARRIER: ICD-10-CM

## 2023-11-14 PROCEDURE — 99214 OFFICE O/P EST MOD 30 MIN: CPT

## 2023-11-14 RX ORDER — EZETIMIBE 10 MG/1
10 TABLET ORAL
Refills: 0 | Status: ACTIVE | COMMUNITY

## 2023-11-14 RX ORDER — FLUTICASONE PROPIONATE 50 UG/1
50 SPRAY, METERED NASAL
Qty: 1 | Refills: 3 | Status: ACTIVE | COMMUNITY
Start: 1900-01-01 | End: 1900-01-01

## 2023-11-16 PROBLEM — J47.9 BRONCHIECTASIS: Status: ACTIVE | Noted: 2019-03-05

## 2023-11-16 PROBLEM — R05.9 COUGH: Status: ACTIVE | Noted: 2019-03-05

## 2023-11-16 PROBLEM — Z14.1 CYSTIC FIBROSIS CARRIER: Status: ACTIVE | Noted: 2019-03-26

## 2023-11-16 PROBLEM — J31.0 CHRONIC RHINITIS: Status: ACTIVE | Noted: 2019-03-26

## 2024-02-13 NOTE — ED PROVIDER NOTE - CONDITION AT DISCHARGE:
Called pt, pt notified and verbalized understanding. Pt asked for number to [facility]. Will send portal message and what kind of procedure to schedule. I offered for PSR to call and schedule with pt but she is at work right now, she stated she will call and schedule.     Improved

## 2024-03-12 ENCOUNTER — RX RENEWAL (OUTPATIENT)
Age: 64
End: 2024-03-12

## 2024-03-12 RX ORDER — AZELASTINE HYDROCHLORIDE 137 UG/1
137 SPRAY, METERED NASAL DAILY
Qty: 30 | Refills: 2 | Status: ACTIVE | COMMUNITY
Start: 2023-11-14 | End: 1900-01-01

## 2024-04-17 ENCOUNTER — RX RENEWAL (OUTPATIENT)
Age: 64
End: 2024-04-17

## 2024-04-17 RX ORDER — FLUTICASONE PROPIONATE 50 UG/1
50 SPRAY, METERED NASAL
Qty: 16 | Refills: 3 | Status: ACTIVE | COMMUNITY
Start: 2024-04-17 | End: 1900-01-01

## 2024-08-29 NOTE — ED PROVIDER NOTE - TEMPLATE
Olive Medina seen at discharge. Full name and  verified; After visit Summary was given. RN reviewed today's visit with patient's mother (patient is a minor), as well as instructions on when it is recommended to return for follow-up visit as needed. RN reviewed the provider's instructions with the patient's mother, answering all questions to her satisfaction. Patient's mother verbalized understanding. Matt Toribio RN    Back Pain

## 2025-01-01 ENCOUNTER — EMERGENCY (EMERGENCY)
Age: 65
LOS: 1 days | Discharge: HOME | End: 2025-01-01
Admitting: EMERGENCY MEDICINE
Payer: COMMERCIAL

## 2025-01-01 VITALS
WEIGHT: 182.98 LBS | DIASTOLIC BLOOD PRESSURE: 71 MMHG | RESPIRATION RATE: 19 BRPM | HEIGHT: 72 IN | TEMPERATURE: 98 F | SYSTOLIC BLOOD PRESSURE: 126 MMHG | HEART RATE: 69 BPM | OXYGEN SATURATION: 95 %

## 2025-01-01 VITALS
DIASTOLIC BLOOD PRESSURE: 78 MMHG | HEART RATE: 66 BPM | OXYGEN SATURATION: 95 % | TEMPERATURE: 97 F | RESPIRATION RATE: 18 BRPM | SYSTOLIC BLOOD PRESSURE: 127 MMHG
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
## 2025-01-01 DIAGNOSIS — Z23 ENCOUNTER FOR IMMUNIZATION: ICD-10-CM

## 2025-01-01 DIAGNOSIS — S90.852A SUPERFICIAL FOREIGN BODY, LEFT FOOT, INITIAL ENCOUNTER: ICD-10-CM

## 2025-04-15 ENCOUNTER — TRANSCRIPTION ENCOUNTER (OUTPATIENT)
Age: 65
End: 2025-04-15

## 2025-05-14 ENCOUNTER — NON-APPOINTMENT (OUTPATIENT)
Age: 65
End: 2025-05-14

## 2025-05-14 ENCOUNTER — APPOINTMENT (OUTPATIENT)
Dept: PULMONOLOGY | Facility: CLINIC | Age: 65
End: 2025-05-14
Payer: COMMERCIAL

## 2025-05-14 VITALS
OXYGEN SATURATION: 95 % | SYSTOLIC BLOOD PRESSURE: 125 MMHG | TEMPERATURE: 98.7 F | BODY MASS INDEX: 26.73 KG/M2 | HEART RATE: 65 BPM | HEIGHT: 72 IN | WEIGHT: 197.31 LBS | DIASTOLIC BLOOD PRESSURE: 78 MMHG

## 2025-05-14 DIAGNOSIS — J31.0 CHRONIC RHINITIS: ICD-10-CM

## 2025-05-14 DIAGNOSIS — J47.9 BRONCHIECTASIS, UNCOMPLICATED: ICD-10-CM

## 2025-05-14 DIAGNOSIS — R05.9 COUGH, UNSPECIFIED: ICD-10-CM

## 2025-05-14 DIAGNOSIS — Z14.1 CYSTIC FIBROSIS CARRIER: ICD-10-CM

## 2025-05-14 PROCEDURE — 99214 OFFICE O/P EST MOD 30 MIN: CPT

## 2025-05-20 ENCOUNTER — APPOINTMENT (OUTPATIENT)
Dept: PULMONOLOGY | Facility: CLINIC | Age: 65
End: 2025-05-20
Payer: COMMERCIAL

## 2025-05-20 PROCEDURE — 94726 PLETHYSMOGRAPHY LUNG VOLUMES: CPT

## 2025-05-20 PROCEDURE — 94618 PULMONARY STRESS TESTING: CPT

## 2025-05-20 PROCEDURE — 94060 EVALUATION OF WHEEZING: CPT

## 2025-05-20 PROCEDURE — 94729 DIFFUSING CAPACITY: CPT

## 2025-05-30 ENCOUNTER — APPOINTMENT (OUTPATIENT)
Dept: CT IMAGING | Facility: CLINIC | Age: 65
End: 2025-05-30